# Patient Record
Sex: MALE | URBAN - METROPOLITAN AREA
[De-identification: names, ages, dates, MRNs, and addresses within clinical notes are randomized per-mention and may not be internally consistent; named-entity substitution may affect disease eponyms.]

---

## 2022-12-20 ENCOUNTER — TELEPHONE (OUTPATIENT)
Dept: TRANSPLANT | Facility: CLINIC | Age: 22
End: 2022-12-20

## 2022-12-20 DIAGNOSIS — E75.25 MLD (METACHROMATIC LEUKODYSTROPHY) (H): Primary | ICD-10-CM

## 2022-12-20 NOTE — TELEPHONE ENCOUNTER
20 yo with adult onset MLD. Needs approval for complex NT with Dr. Glass and team, HLA, PRA, CMV, ABO, and URD search.

## 2022-12-21 ENCOUNTER — MEDICAL CORRESPONDENCE (OUTPATIENT)
Dept: TRANSPLANT | Facility: CLINIC | Age: 22
End: 2022-12-21